# Patient Record
Sex: FEMALE | URBAN - METROPOLITAN AREA
[De-identification: names, ages, dates, MRNs, and addresses within clinical notes are randomized per-mention and may not be internally consistent; named-entity substitution may affect disease eponyms.]

---

## 2019-10-11 ENCOUNTER — APPOINTMENT (RX ONLY)
Dept: URBAN - METROPOLITAN AREA CLINIC 95 | Facility: CLINIC | Age: 44
Setting detail: DERMATOLOGY
End: 2019-10-11

## 2019-10-11 DIAGNOSIS — Z41.9 ENCOUNTER FOR PROCEDURE FOR PURPOSES OTHER THAN REMEDYING HEALTH STATE, UNSPECIFIED: ICD-10-CM

## 2019-10-11 PROCEDURE — ? ADDITIONAL NOTES

## 2019-10-11 PROCEDURE — ? BOTOX

## 2019-10-11 ASSESSMENT — LOCATION DETAILED DESCRIPTION DERM
LOCATION DETAILED: RIGHT SUPERIOR CENTRAL MALAR CHEEK
LOCATION DETAILED: LEFT MID TEMPLE
LOCATION DETAILED: LEFT LATERAL EYEBROW
LOCATION DETAILED: LEFT LATERAL FOREHEAD
LOCATION DETAILED: LEFT MEDIAL EYEBROW
LOCATION DETAILED: RIGHT MEDIAL EYEBROW
LOCATION DETAILED: RIGHT INFERIOR FOREHEAD
LOCATION DETAILED: RIGHT LATERAL EYEBROW
LOCATION DETAILED: RIGHT INFERIOR TEMPLE
LOCATION DETAILED: LEFT SUPERIOR CENTRAL MALAR CHEEK
LOCATION DETAILED: RIGHT FOREHEAD
LOCATION DETAILED: LEFT INFERIOR FOREHEAD

## 2019-10-11 ASSESSMENT — LOCATION SIMPLE DESCRIPTION DERM
LOCATION SIMPLE: RIGHT TEMPLE
LOCATION SIMPLE: RIGHT FOREHEAD
LOCATION SIMPLE: LEFT FOREHEAD
LOCATION SIMPLE: LEFT EYEBROW
LOCATION SIMPLE: LEFT TEMPLE
LOCATION SIMPLE: LEFT CHEEK
LOCATION SIMPLE: RIGHT CHEEK
LOCATION SIMPLE: RIGHT EYEBROW

## 2019-10-11 ASSESSMENT — LOCATION ZONE DERM: LOCATION ZONE: FACE

## 2019-10-11 NOTE — PROCEDURE: BOTOX
Show Lateral Platysmal Band Units: Yes
Right Pupillary Line Units: 0
Show Right And Left Periorbital Units: No
Price (Use Numbers Only, No Special Characters Or $): 378
Additional Area 1 Units: 15
Periorbital Skin Units: 12
Additional Area 2 Location: bunny lines
Reconstitution Date (Optional): 10/11/2019
Additional Area 5 Location: chin
Lot #: [id number]
Additional Area 1 Location: glabella/Forehead
Additional Area 4 Location: upper lip
Expiration Date (Month Year): 02/2022
Consent: Written consent obtained. Risks include but not limited to lid/brow ptosis, bruising, swelling, diplopia, temporary effect, incomplete chemical denervation.
Additional Area 6 Location: corner of mouth
Dilution (U/0.1 Cc): 1
Detail Level: Simple
Post-Care Instructions: Patient instructed to not lie down for 4 hours and limit physical activity for 24 hours. Patient instructed not to travel by airplane for 48 hours.

## 2019-10-11 NOTE — PROCEDURE: ADDITIONAL NOTES
Detail Level: Detailed
Additional Notes: Patient has been getting botox for years but her doctor is retiring. She does not have a lot of movement. Told her to fu if she thought she needed more or if something just does not look right.

## 2019-10-11 NOTE — PROCEDURE: MIPS QUALITY
Quality 130: Documentation Of Current Medications In The Medical Record: Current Medications Documented
Detail Level: Detailed
Quality 226: Preventive Care And Screening: Tobacco Use: Screening And Cessation Intervention: Patient screened for tobacco use and is an ex/non-smoker
Quality 110: Preventive Care And Screening: Influenza Immunization: Influenza Immunization not Administered for Documented Reasons.
Quality 131: Pain Assessment And Follow-Up: Pain assessment using a standardized tool is documented as negative, no follow-up plan required

## 2019-12-05 ENCOUNTER — APPOINTMENT (RX ONLY)
Dept: URBAN - METROPOLITAN AREA CLINIC 95 | Facility: CLINIC | Age: 44
Setting detail: DERMATOLOGY
End: 2019-12-05

## 2019-12-05 DIAGNOSIS — Z41.9 ENCOUNTER FOR PROCEDURE FOR PURPOSES OTHER THAN REMEDYING HEALTH STATE, UNSPECIFIED: ICD-10-CM

## 2019-12-05 PROCEDURE — ? BOTOX

## 2019-12-05 PROCEDURE — ? ADDITIONAL NOTES

## 2019-12-05 ASSESSMENT — LOCATION DETAILED DESCRIPTION DERM
LOCATION DETAILED: RIGHT CENTRAL LATERAL NECK
LOCATION DETAILED: LEFT LATERAL EYEBROW
LOCATION DETAILED: RIGHT INFERIOR TEMPLE
LOCATION DETAILED: RIGHT FOREHEAD
LOCATION DETAILED: LEFT INFERIOR FOREHEAD
LOCATION DETAILED: LEFT CENTRAL ANTERIOR NECK
LOCATION DETAILED: RIGHT MEDIAL EYEBROW
LOCATION DETAILED: RIGHT LATERAL EYEBROW
LOCATION DETAILED: RIGHT INFERIOR FOREHEAD
LOCATION DETAILED: LEFT LATERAL FOREHEAD
LOCATION DETAILED: SUBMENTAL CHIN
LOCATION DETAILED: RIGHT CENTRAL ANTERIOR NECK
LOCATION DETAILED: LEFT MID TEMPLE
LOCATION DETAILED: LEFT CENTRAL LATERAL NECK
LOCATION DETAILED: RIGHT SUPERIOR CENTRAL MALAR CHEEK
LOCATION DETAILED: LEFT MEDIAL EYEBROW
LOCATION DETAILED: LEFT SUPERIOR CENTRAL MALAR CHEEK

## 2019-12-05 ASSESSMENT — LOCATION SIMPLE DESCRIPTION DERM
LOCATION SIMPLE: LEFT CHEEK
LOCATION SIMPLE: RIGHT FOREHEAD
LOCATION SIMPLE: NECK
LOCATION SIMPLE: LEFT TEMPLE
LOCATION SIMPLE: RIGHT EYEBROW
LOCATION SIMPLE: SUBMENTAL CHIN
LOCATION SIMPLE: RIGHT TEMPLE
LOCATION SIMPLE: LEFT FOREHEAD
LOCATION SIMPLE: LEFT EYEBROW
LOCATION SIMPLE: RIGHT CHEEK

## 2019-12-05 ASSESSMENT — LOCATION ZONE DERM
LOCATION ZONE: FACE
LOCATION ZONE: NECK

## 2019-12-05 NOTE — PROCEDURE: BOTOX
Additional Area 3 Location: Chin
Show Lateral Platysmal Band Units: Yes
Additional Area 3 Units: 0
Show Right And Left Brow Units: No
Dilution (U/0.1 Cc): 1
Anterior Platysmal Bands Units: 8
Consent: Written consent obtained. Risks include but not limited to lid/brow ptosis, bruising, swelling, diplopia, temporary effect, incomplete chemical denervation.
Additional Area 4 Location: upper lip
Reconstitution Date (Optional): 12/5/2019
Post-Care Instructions: Patient instructed to not lie down for 4 hours and limit physical activity for 24 hours. Patient instructed not to travel by airplane for 48 hours.
Additional Area 6 Location: corner of mouth
Additional Area 1 Units: 15
Detail Level: Simple
Additional Area 2 Location: bunny lines
Lot #: [id number]
Additional Area 1 Location: glabella/Forehead
Expiration Date (Month Year): 03/2022
Periorbital Skin Units: 12
Price (Use Numbers Only, No Special Characters Or $): 922

## 2019-12-05 NOTE — PROCEDURE: ADDITIONAL NOTES
Additional Notes: Patient has been getting botox for years but her doctor is retiring. She does not have a lot of movement. Told her to fu if she thought she needed more or if something just does not look right.
Detail Level: Detailed

## 2020-02-26 ENCOUNTER — APPOINTMENT (RX ONLY)
Dept: URBAN - METROPOLITAN AREA CLINIC 95 | Facility: CLINIC | Age: 45
Setting detail: DERMATOLOGY
End: 2020-02-26

## 2020-02-26 DIAGNOSIS — Z41.9 ENCOUNTER FOR PROCEDURE FOR PURPOSES OTHER THAN REMEDYING HEALTH STATE, UNSPECIFIED: ICD-10-CM

## 2020-02-26 PROCEDURE — ? ADDITIONAL NOTES

## 2020-02-26 PROCEDURE — ? BOTOX

## 2020-02-26 ASSESSMENT — LOCATION SIMPLE DESCRIPTION DERM
LOCATION SIMPLE: RIGHT EYEBROW
LOCATION SIMPLE: LEFT EYEBROW
LOCATION SIMPLE: RIGHT FOREHEAD
LOCATION SIMPLE: RIGHT TEMPLE
LOCATION SIMPLE: LEFT CHEEK
LOCATION SIMPLE: LEFT FOREHEAD
LOCATION SIMPLE: RIGHT CHEEK

## 2020-02-26 ASSESSMENT — LOCATION DETAILED DESCRIPTION DERM
LOCATION DETAILED: LEFT MEDIAL EYEBROW
LOCATION DETAILED: LEFT SUPERIOR CENTRAL MALAR CHEEK
LOCATION DETAILED: RIGHT LATERAL EYEBROW
LOCATION DETAILED: LEFT INFERIOR FOREHEAD
LOCATION DETAILED: LEFT FOREHEAD
LOCATION DETAILED: RIGHT SUPERIOR LATERAL MALAR CHEEK
LOCATION DETAILED: LEFT SUPERIOR FOREHEAD
LOCATION DETAILED: LEFT LATERAL EYEBROW
LOCATION DETAILED: RIGHT MEDIAL EYEBROW
LOCATION DETAILED: RIGHT INFERIOR FOREHEAD
LOCATION DETAILED: RIGHT MID TEMPLE
LOCATION DETAILED: RIGHT FOREHEAD
LOCATION DETAILED: LEFT SUPERIOR LATERAL MALAR CHEEK
LOCATION DETAILED: RIGHT SUPERIOR CENTRAL MALAR CHEEK

## 2020-02-26 ASSESSMENT — LOCATION ZONE DERM: LOCATION ZONE: FACE

## 2020-02-26 NOTE — PROCEDURE: ADDITIONAL NOTES
Additional Notes: Add 5 more units in her glabella/forehead and 3 more around each eye to see if we could get it to hold longer.
Detail Level: Detailed

## 2020-05-21 ENCOUNTER — APPOINTMENT (RX ONLY)
Dept: URBAN - METROPOLITAN AREA CLINIC 95 | Facility: CLINIC | Age: 45
Setting detail: DERMATOLOGY
End: 2020-05-21

## 2020-05-21 DIAGNOSIS — Z41.9 ENCOUNTER FOR PROCEDURE FOR PURPOSES OTHER THAN REMEDYING HEALTH STATE, UNSPECIFIED: ICD-10-CM

## 2020-05-21 PROCEDURE — ? BOTOX

## 2020-05-21 PROCEDURE — ? ADDITIONAL NOTES

## 2020-05-21 ASSESSMENT — LOCATION SIMPLE DESCRIPTION DERM
LOCATION SIMPLE: LEFT CHEEK
LOCATION SIMPLE: LEFT FOREHEAD
LOCATION SIMPLE: RIGHT TEMPLE
LOCATION SIMPLE: RIGHT EYEBROW
LOCATION SIMPLE: RIGHT CHEEK
LOCATION SIMPLE: RIGHT FOREHEAD
LOCATION SIMPLE: LEFT EYEBROW

## 2020-05-21 ASSESSMENT — LOCATION DETAILED DESCRIPTION DERM
LOCATION DETAILED: RIGHT MID TEMPLE
LOCATION DETAILED: LEFT FOREHEAD
LOCATION DETAILED: RIGHT INFERIOR FOREHEAD
LOCATION DETAILED: RIGHT MEDIAL EYEBROW
LOCATION DETAILED: RIGHT SUPERIOR LATERAL MALAR CHEEK
LOCATION DETAILED: RIGHT SUPERIOR CENTRAL MALAR CHEEK
LOCATION DETAILED: LEFT MEDIAL EYEBROW
LOCATION DETAILED: RIGHT FOREHEAD
LOCATION DETAILED: LEFT SUPERIOR FOREHEAD
LOCATION DETAILED: LEFT INFERIOR FOREHEAD
LOCATION DETAILED: LEFT SUPERIOR LATERAL MALAR CHEEK
LOCATION DETAILED: LEFT SUPERIOR CENTRAL MALAR CHEEK
LOCATION DETAILED: LEFT LATERAL EYEBROW
LOCATION DETAILED: RIGHT LATERAL EYEBROW

## 2020-05-21 ASSESSMENT — LOCATION ZONE DERM: LOCATION ZONE: FACE

## 2020-05-21 NOTE — PROCEDURE: BOTOX
Show Right And Left Periorbital Units: No
R Brow Units: 0
Expiration Date (Month Year): 10/2022
Additional Area 5 Location: chin
Detail Level: Simple
Show Levator Superior Units: Yes
Price (Use Numbers Only, No Special Characters Or $): 292
Additional Area 2 Location: bunny lines
Dilution (U/0.1 Cc): 1
Periorbital Skin Units: 18
Post-Care Instructions: Patient instructed to not lie down for 4 hours and limit physical activity for 24 hours. Patient instructed not to travel by airplane for 48 hours.
Additional Area 4 Location: upper lip
Glabellar Complex Units: 20
Reconstitution Date (Optional): 05/21/2022
Lot #: [id number]
Additional Area 1 Location: glabella/Forehead
Additional Area 6 Location: corner of mouth
Consent: Written consent obtained. Risks include but not limited to lid/brow ptosis, bruising, swelling, diplopia, temporary effect, incomplete chemical denervation.

## 2020-06-22 ENCOUNTER — APPOINTMENT (RX ONLY)
Dept: URBAN - METROPOLITAN AREA CLINIC 95 | Facility: CLINIC | Age: 45
Setting detail: DERMATOLOGY
End: 2020-06-22

## 2020-06-22 DIAGNOSIS — Z41.9 ENCOUNTER FOR PROCEDURE FOR PURPOSES OTHER THAN REMEDYING HEALTH STATE, UNSPECIFIED: ICD-10-CM

## 2020-06-22 PROCEDURE — ? BOTOX

## 2020-06-22 PROCEDURE — ? ADDITIONAL NOTES

## 2020-06-22 ASSESSMENT — LOCATION SIMPLE DESCRIPTION DERM
LOCATION SIMPLE: RIGHT CHEEK
LOCATION SIMPLE: LEFT FOREHEAD
LOCATION SIMPLE: RIGHT FOREHEAD
LOCATION SIMPLE: RIGHT EYEBROW
LOCATION SIMPLE: GLABELLA
LOCATION SIMPLE: LEFT CHEEK

## 2020-06-22 ASSESSMENT — LOCATION DETAILED DESCRIPTION DERM
LOCATION DETAILED: GLABELLA
LOCATION DETAILED: RIGHT SUPERIOR CENTRAL MALAR CHEEK
LOCATION DETAILED: LEFT SUPERIOR CENTRAL MALAR CHEEK
LOCATION DETAILED: LEFT INFERIOR FOREHEAD
LOCATION DETAILED: RIGHT INFERIOR FOREHEAD
LOCATION DETAILED: RIGHT MEDIAL EYEBROW

## 2020-06-22 ASSESSMENT — LOCATION ZONE DERM: LOCATION ZONE: FACE

## 2020-06-22 NOTE — PROCEDURE: BOTOX
Additional Area 5 Location: chin
Glabellar Complex Units: 10
Additional Area 3 Units: 0
Show Lcl Units: No
Show Additional Area 1: Yes
Additional Area 1 Location: glabella/Forehead
Detail Level: Simple
Expiration Date (Month Year): 01/2023
Additional Area 6 Location: corner of mouth
Consent: Written consent obtained. Risks include but not limited to lid/brow ptosis, bruising, swelling, diplopia, temporary effect, incomplete chemical denervation.
Lot #: [id number]
Additional Area 2 Location: upper lip
Reconstitution Date (Optional): 6/22/2020
Price (Use Numbers Only, No Special Characters Or $): 743
Post-Care Instructions: Patient instructed to not lie down for 4 hours and limit physical activity for 24 hours. Patient instructed not to travel by airplane for 48 hours.
Dilution (U/0.1 Cc): 1

## 2020-06-28 NOTE — PROCEDURE: ADDITIONAL NOTES
fall, head injury, facial wound
Additional Notes: Patients glabella area is strong. Next time will do 25 units instead of 20 land around her eyes will do more also . Discussed filler for around her mouth, oral comm and a little flatness above her upper lip. Will do one syringe of either Restylane Refyne or Kysse to fill that in.
Detail Level: Detailed
Statement Selected

## 2022-10-10 ENCOUNTER — APPOINTMENT (RX ONLY)
Dept: URBAN - METROPOLITAN AREA CLINIC 114 | Facility: CLINIC | Age: 47
Setting detail: DERMATOLOGY
End: 2022-10-10

## 2022-10-10 DIAGNOSIS — Z41.9 ENCOUNTER FOR PROCEDURE FOR PURPOSES OTHER THAN REMEDYING HEALTH STATE, UNSPECIFIED: ICD-10-CM

## 2022-10-10 PROCEDURE — ? ADDITIONAL NOTES

## 2022-10-10 PROCEDURE — ? DYSPORT

## 2022-10-10 NOTE — PROCEDURE: DYSPORT
Show Topical Anesthesia: Yes
Additional Area 6 Units: 0
Show Lcl Units: No
Lot #: [id number]
Dilution (U/ 0.1cc): 10
Periorbital Skin Units: 50
Expiration Date (Month Year): 4/30/23
Nasal Root Units: 30
Detail Level: Detailed
Price (Use Numbers Only, No Special Characters Or $): 209
Consent: Written consent obtained. Risks include but not limited to lid/brow ptosis, bruising, swelling, diplopia, temporary effect, incomplete chemical denervation.
Additional Area 1 Location: Lip
Glabellar Complex Units: 25
Post-Care Instructions: Patient instructed to not lie down for 4 hours and limit physical activity for 24 hours.
Additional Area 1 Units: 13

## 2022-10-10 NOTE — PROCEDURE: ADDITIONAL NOTES
Detail Level: Simple
Render Risk Assessment In Note?: no
Additional Notes: .\\nPt want to do lip flip today, discussed issues with eating and drinking and how it is much safer to do  this area, she will think about this \\nWe will try dysport today and pt will see if she likely it better than Botox or not \\nTouch up units today as she had Botox about 3 months ago

## 2023-01-12 ENCOUNTER — APPOINTMENT (RX ONLY)
Dept: URBAN - METROPOLITAN AREA CLINIC 114 | Facility: CLINIC | Age: 48
Setting detail: DERMATOLOGY
End: 2023-01-12

## 2023-01-12 DIAGNOSIS — Z41.9 ENCOUNTER FOR PROCEDURE FOR PURPOSES OTHER THAN REMEDYING HEALTH STATE, UNSPECIFIED: ICD-10-CM

## 2023-01-12 PROCEDURE — ? DYSPORT

## 2023-01-12 NOTE — PROCEDURE: DYSPORT
Left Periorbital Units: 0
Show Forehead Units: Yes
Dilution (U/ 0.1cc): 10
Glabellar Complex Units: 25
Show Right And Left Pupillary Line Units: No
Nasal Root Units: 30
Expiration Date (Month Year): 7/31/2023
Price (Use Numbers Only, No Special Characters Or $): 760
Detail Level: Detailed
Consent: Written consent obtained. Risks include but not limited to lid/brow ptosis, bruising, swelling, diplopia, temporary effect, incomplete chemical denervation.
Periorbital Skin Units: 50
Additional Area 1 Location: Upper Lip
Post-Care Instructions: Patient instructed to not lie down for 4 hours and limit physical activity for 24 hours.
Additional Area 1 Units: 13
Lot #: [id number]
abdominal surgery

## 2023-04-27 ENCOUNTER — APPOINTMENT (RX ONLY)
Dept: URBAN - METROPOLITAN AREA CLINIC 114 | Facility: CLINIC | Age: 48
Setting detail: DERMATOLOGY
End: 2023-04-27

## 2023-04-27 DIAGNOSIS — Z41.9 ENCOUNTER FOR PROCEDURE FOR PURPOSES OTHER THAN REMEDYING HEALTH STATE, UNSPECIFIED: ICD-10-CM

## 2023-04-27 PROCEDURE — ? DYSPORT

## 2023-04-27 PROCEDURE — ? ADDITIONAL NOTES

## 2023-04-27 NOTE — PROCEDURE: ADDITIONAL NOTES
Render Risk Assessment In Note?: no
Detail Level: Simple
Additional Notes: .\\n.this visit we added 20 units for forehead but I do not believe this was updated in fee sheet and wasn’t charged

## 2023-04-27 NOTE — PROCEDURE: DYSPORT
Left Periorbital Units: 0
Show Forehead Units: Yes
Dilution (U/ 0.1cc): 10
Glabellar Complex Units: 25
Show Right And Left Pupillary Line Units: No
Nasal Root Units: 30
Expiration Date (Month Year): 7/31/2023
Price (Use Numbers Only, No Special Characters Or $): 809
Detail Level: Detailed
Consent: Written consent obtained. Risks include but not limited to lid/brow ptosis, bruising, swelling, diplopia, temporary effect, incomplete chemical denervation.
Periorbital Skin Units: 50
Additional Area 1 Location: Upper Lip
Post-Care Instructions: Patient instructed to not lie down for 4 hours and limit physical activity for 24 hours.
Additional Area 1 Units: 13
Lot #: [id number]

## 2023-08-24 ENCOUNTER — APPOINTMENT (RX ONLY)
Dept: URBAN - METROPOLITAN AREA CLINIC 114 | Facility: CLINIC | Age: 48
Setting detail: DERMATOLOGY
End: 2023-08-24

## 2023-08-24 DIAGNOSIS — Z41.9 ENCOUNTER FOR PROCEDURE FOR PURPOSES OTHER THAN REMEDYING HEALTH STATE, UNSPECIFIED: ICD-10-CM

## 2023-08-24 PROCEDURE — ? DYSPORT

## 2023-08-24 PROCEDURE — ? PRESCRIPTION

## 2023-08-24 RX ORDER — OXYMETAZOLINE HYDROCHLORIDE OPHTHALMIC 1 MG/ML
SOLUTION/ DROPS OPHTHALMIC
Qty: 45 | Refills: 6 | COMMUNITY
Start: 2023-08-24

## 2023-08-24 RX ADMIN — OXYMETAZOLINE HYDROCHLORIDE OPHTHALMIC: 1 SOLUTION/ DROPS OPHTHALMIC at 00:00

## 2023-08-24 NOTE — PROCEDURE: DYSPORT
Left Periorbital Units: 0
Show Forehead Units: Yes
Dilution (U/ 0.1cc): 10
Glabellar Complex Units: 25
Show Right And Left Pupillary Line Units: No
Nasal Root Units: 30
Expiration Date (Month Year): 3/31/25
Price (Use Numbers Only, No Special Characters Or $): 341
Detail Level: Detailed
Consent: Written consent obtained. Risks include but not limited to lid/brow ptosis, bruising, swelling, diplopia, temporary effect, incomplete chemical denervation.
Periorbital Skin Units: 50
Additional Area 1 Location: Upper Lip
Post-Care Instructions: Patient instructed to not lie down for 4 hours and limit physical activity for 24 hours.
Additional Area 1 Units: 13
Lot #: [id number]

## 2024-02-14 ENCOUNTER — APPOINTMENT (RX ONLY)
Dept: URBAN - METROPOLITAN AREA CLINIC 114 | Facility: CLINIC | Age: 49
Setting detail: DERMATOLOGY
End: 2024-02-14

## 2024-02-14 DIAGNOSIS — Z41.9 ENCOUNTER FOR PROCEDURE FOR PURPOSES OTHER THAN REMEDYING HEALTH STATE, UNSPECIFIED: ICD-10-CM

## 2024-02-14 PROCEDURE — ? DYSPORT

## 2024-02-14 NOTE — PROCEDURE: DYSPORT
Left Periorbital Skin Units: 0
Additional Area 2 Units: 75
Show Forehead Units: Yes
Dilution (U/0.1 Cc): 10
Glabellar Complex Units: 25
Show Right And Left Pupillary Line Units: No
Nasal Root Units: 30
Expiration Date (Month Year): 3/31/25
Price (Use Numbers Only, No Special Characters Or $): 1859
Detail Level: Detailed
Consent: Written consent obtained. Risks include but not limited to lid/brow ptosis, bruising, swelling, diplopia, temporary effect, incomplete chemical denervation.
Periorbital Skin Units: 50
Additional Area 1 Location: upper Lip
Post-Care Instructions: Patient instructed to not lie down for 4 hours and limit physical activity for 24 hours.
Additional Area 1 Units: 13
Additional Area 2 Location: neck
Lot #: [id number]

## 2024-07-15 ENCOUNTER — APPOINTMENT (RX ONLY)
Dept: URBAN - METROPOLITAN AREA CLINIC 114 | Facility: CLINIC | Age: 49
Setting detail: DERMATOLOGY
End: 2024-07-15

## 2024-07-15 DIAGNOSIS — Z41.9 ENCOUNTER FOR PROCEDURE FOR PURPOSES OTHER THAN REMEDYING HEALTH STATE, UNSPECIFIED: ICD-10-CM

## 2024-07-15 PROCEDURE — ? DYSPORT

## 2024-07-15 NOTE — PROCEDURE: DYSPORT
Left Periorbital Skin Units: 0
Additional Area 2 Units: 75
Show Forehead Units: Yes
Dilution (U/0.1 Cc): 10
Glabellar Complex Units: 25
Show Right And Left Pupillary Line Units: No
Nasal Root Units: 30
Expiration Date (Month Year): 3/31/25
Price (Use Numbers Only, No Special Characters Or $): 2468
Detail Level: Detailed
Consent: Written consent obtained. Risks include but not limited to lid/brow ptosis, bruising, swelling, diplopia, temporary effect, incomplete chemical denervation.
Periorbital Skin Units: 50
Additional Area 1 Location: upper Lip
Post-Care Instructions: Patient instructed to not lie down for 4 hours and limit physical activity for 24 hours.
Additional Area 1 Units: 13
Additional Area 2 Location: neck
Lot #: [id number]

## 2024-12-13 ENCOUNTER — APPOINTMENT (OUTPATIENT)
Dept: URBAN - METROPOLITAN AREA CLINIC 114 | Facility: CLINIC | Age: 49
Setting detail: DERMATOLOGY
End: 2024-12-13

## 2024-12-13 DIAGNOSIS — Z41.9 ENCOUNTER FOR PROCEDURE FOR PURPOSES OTHER THAN REMEDYING HEALTH STATE, UNSPECIFIED: ICD-10-CM

## 2024-12-13 PROCEDURE — ? DYSPORT

## 2024-12-13 NOTE — PROCEDURE: DYSPORT
Left Periorbital Skin Units: 0
Additional Area 2 Units: 75
Show Forehead Units: Yes
Dilution (U/0.1 Cc): 10
Forehead Units: 15
Glabellar Complex Units: 25
Show Right And Left Pupillary Line Units: No
Nasal Root Units: 30
Expiration Date (Month Year): 06/30/2026
Price (Use Numbers Only, No Special Characters Or $): 5326
Detail Level: Detailed
Consent: Written consent obtained. Risks include but not limited to lid/brow ptosis, bruising, swelling, diplopia, temporary effect, incomplete chemical denervation.
Periorbital Skin Units: 50
Additional Area 1 Location: upper Lip
Post-Care Instructions: Patient instructed to not lie down for 4 hours and limit physical activity for 24 hours.
Additional Area 1 Units: 13
Additional Area 2 Location: neck
Lot #: [id number]
- Continue with antihypertensive medications

## 2025-05-12 ENCOUNTER — APPOINTMENT (OUTPATIENT)
Dept: URBAN - METROPOLITAN AREA CLINIC 114 | Facility: CLINIC | Age: 50
Setting detail: DERMATOLOGY
End: 2025-05-12

## 2025-05-12 DIAGNOSIS — Z41.9 ENCOUNTER FOR PROCEDURE FOR PURPOSES OTHER THAN REMEDYING HEALTH STATE, UNSPECIFIED: ICD-10-CM

## 2025-05-12 PROCEDURE — ? DYSPORT

## 2025-05-12 NOTE — PROCEDURE: DYSPORT
Left Periorbital Skin Units: 0
Additional Area 2 Units: 75
Show Forehead Units: Yes
Dilution (U/0.1 Cc): 10
Forehead Units: 15
Glabellar Complex Units: 25
Show Right And Left Pupillary Line Units: No
Nasal Root Units: 30
Expiration Date (Month Year): 06/30/2026
Price (Use Numbers Only, No Special Characters Or $): 8825.23
Detail Level: Detailed
Consent: Written consent obtained. Risks include but not limited to lid/brow ptosis, bruising, swelling, diplopia, temporary effect, incomplete chemical denervation.
Periorbital Skin Units: 50
Additional Area 1 Location: upper Lip
Post-Care Instructions: Patient instructed to not lie down for 4 hours and limit physical activity for 24 hours.
Additional Area 1 Units: 13
Additional Area 2 Location: neck
Lot #: [id number]